# Patient Record
Sex: FEMALE | Race: WHITE | NOT HISPANIC OR LATINO | Employment: UNEMPLOYED | URBAN - METROPOLITAN AREA
[De-identification: names, ages, dates, MRNs, and addresses within clinical notes are randomized per-mention and may not be internally consistent; named-entity substitution may affect disease eponyms.]

---

## 2018-04-18 PROBLEM — A04.72 GASTROENTERITIS DUE TO CLOSTRIDIUM DIFFICILE: Status: ACTIVE | Noted: 2018-04-18

## 2018-04-18 PROBLEM — A04.72 GASTROENTERITIS DUE TO CLOSTRIDIUM DIFFICILE: Status: RESOLVED | Noted: 2018-04-18 | Resolved: 2018-04-18

## 2018-04-25 PROBLEM — F41.9 ANXIETY: Status: ACTIVE | Noted: 2018-04-25

## 2018-04-25 PROBLEM — R10.9 ABDOMINAL PAIN: Status: ACTIVE | Noted: 2018-04-25

## 2018-04-25 PROBLEM — R05.9 COUGH: Status: ACTIVE | Noted: 2018-04-25

## 2018-04-25 PROBLEM — I10 HTN (HYPERTENSION): Status: ACTIVE | Noted: 2018-04-25

## 2018-04-26 PROBLEM — K92.2 LOWER GI BLEEDING: Status: ACTIVE | Noted: 2018-04-26

## 2018-04-27 PROBLEM — A49.8 CLOSTRIDIUM DIFFICILE INFECTION: Status: ACTIVE | Noted: 2018-04-18

## 2018-04-27 PROBLEM — K92.2 LOWER GI BLEEDING: Status: RESOLVED | Noted: 2018-04-26 | Resolved: 2018-04-27

## 2018-04-27 PROBLEM — R10.9 ABDOMINAL PAIN: Status: RESOLVED | Noted: 2018-04-25 | Resolved: 2018-04-27

## 2018-05-24 PROBLEM — R11.0 NAUSEA: Status: ACTIVE | Noted: 2018-05-24

## 2018-06-04 ENCOUNTER — RESEARCH ENCOUNTER (OUTPATIENT)
Dept: RESEARCH | Facility: HOSPITAL | Age: 58
End: 2018-06-04

## 2018-06-04 ENCOUNTER — OFFICE VISIT (OUTPATIENT)
Dept: INFECTIOUS DISEASES | Facility: CLINIC | Age: 58
End: 2018-06-04
Payer: MEDICAID

## 2018-06-04 VITALS
BODY MASS INDEX: 28.64 KG/M2 | SYSTOLIC BLOOD PRESSURE: 107 MMHG | HEART RATE: 81 BPM | TEMPERATURE: 99 F | HEIGHT: 62 IN | WEIGHT: 155.63 LBS | DIASTOLIC BLOOD PRESSURE: 68 MMHG

## 2018-06-04 DIAGNOSIS — F17.200 CURRENT EVERY DAY SMOKER: ICD-10-CM

## 2018-06-04 DIAGNOSIS — R11.0 NAUSEA: ICD-10-CM

## 2018-06-04 DIAGNOSIS — A49.8 CLOSTRIDIUM DIFFICILE INFECTION: Primary | ICD-10-CM

## 2018-06-04 PROCEDURE — 99999 PR PBB SHADOW E&M-NEW PATIENT-LVL IV: CPT | Mod: PBBFAC,,, | Performed by: CLINICAL NURSE SPECIALIST

## 2018-06-04 PROCEDURE — 99204 OFFICE O/P NEW MOD 45 MIN: CPT | Mod: PBBFAC | Performed by: CLINICAL NURSE SPECIALIST

## 2018-06-04 PROCEDURE — 99203 OFFICE O/P NEW LOW 30 MIN: CPT | Mod: S$PBB,,, | Performed by: CLINICAL NURSE SPECIALIST

## 2018-06-04 RX ORDER — PROCHLORPERAZINE MALEATE 10 MG
10 TABLET ORAL 3 TIMES DAILY PRN
Qty: 21 TABLET | Refills: 0 | Status: ON HOLD | OUTPATIENT
Start: 2018-06-04 | End: 2018-07-30 | Stop reason: HOSPADM

## 2018-06-04 NOTE — LETTER
June 4, 2018      Robin Mejias MD  [address] [facility]  [place] [address]           [facility] - Infectious Diseases  [address]  [facility] [address]  Phone: [phone]  Fax: [phone]          Patient: Whitney Chapman   MR Number: [id number]   YOB: 1960   Date of Visit: 6/4/2018       Dear : Dr. Mejias    Thank you for referring Whitney Chapman to me for evaluation. Attached you will find relevant portions of my assessment and plan of care.    If you have questions, please do not hesitate to call me. I look forward to following Whitney Chapman along with you.    Sincerely,    Katie Hansen, CNS    Enclosure  CC:  No Recipients    If you would like to receive this communication electronically, please contact [email or web address] or [phone] to request more information on [employer] Link access.    For providers and/or their staff who would like to refer a patient to [facility], please contact us through our one-stop-shop provider referral line, [facility], at [phone].    If you feel you have received this communication in error or would no longer like to receive these types of communications, please e-mail [email or web address]

## 2018-06-04 NOTE — PROGRESS NOTES
Subjective:      Patient ID: Whtiney Chapman is a 58 y.o. female.    Chief Complaint:Diarrhea      History of Present Illness    Ms. Whitney Chapman is a 58 y.o. female with a past medical history of HTN, anxiety, and depression. She is a current every day smoker with a 84 pack year history. She is being seen in clinic today for evaluation of recurrent C. Diff. Her daughter is with her today.     The patient reports in February of this year her  passed away after a month stay in the ICU. The patient reports she first began having diarrhea around the time of his death that she attributed to stress. On 3/28/18 she presented to the emergency room with complaints of diarrhea. It was suspected that she had C. Diff and gastroenteritis, however a test on 4/3/18 was negative. She was prescribed 10 day course of Flagyl and Cipro. By the end of the 10 days the patient states she was feeling better and was having 2 watery diarrhea stools/day.    Ms. Chapman was admitted to [facility] from 4/25/18-4/27/18 for complaints of abdominal pain, vomiting, diarrhea, and blood in her stool. C. Diff test positive and many WBC's found, all other stool tests negative. She was sent home on 10 day course of Vancomycin. Patient reports that diarrhea improved but has never completely resolved. Patient seen on 5/24/18 by Dr. Mejias with complaints of diarrhea up to 10x day. C. Diff positive and placed on Vancomycin taper.    Today patient reports she is having 2-5 watery bowel movements a day, usually in the morning. States today she has had 5 episodes prior to coming to clinic. Denies fever. Complains of stomach cramping, that has improved over the last month, and nausea. States she has lost approximately 20 pounds since February, is very depressed and anxious over this infection.       Component      Latest Ref Rng & Units 5/24/2018 4/26/2018 4/3/2018   C. diff Antigen      Negative Positive (A) Positive (A) Negative   C difficile Toxins  A+B, EIA      Negative Positive (A) Negative Negative   C. diff PCR      Negative  Positive (A)      Called and spoke to [facility] in [place]. Patient prescribed the following for treatment of C. Diff Episodes:    1. 4/27/18-5/7/18- Vancomycin 125mg po q6 for 10 days  2. 5/24/18- Vancomycin pulsed-tapered regimen ordered      125 mg orally four times daily for 10 to 14 days, then      125 mg orally twice daily for 7 days, then      125 mg orally once daily for 7 days, then                 125 mg orally every 2 or 3 days for 2 to 8 weeks      Review of Systems   Constitution: Positive for chills, decreased appetite, weakness, malaise/fatigue, night sweats and weight loss. Negative for fever and weight gain.   HENT: Positive for congestion. Negative for ear pain, hearing loss, hoarse voice, sore throat and tinnitus.    Eyes: Negative for blurred vision, redness and visual disturbance.   Cardiovascular: Negative for chest pain, leg swelling and palpitations.   Respiratory: Positive for cough and shortness of breath. Negative for hemoptysis and sputum production.    Hematologic/Lymphatic: Negative for adenopathy. Does not bruise/bleed easily.   Skin: Negative for dry skin, itching, rash and suspicious lesions.   Musculoskeletal: Positive for back pain, myalgias and neck pain. Negative for joint pain.   Gastrointestinal: Positive for abdominal pain, diarrhea, heartburn and nausea. Negative for constipation and vomiting.   Genitourinary: Negative for dysuria, flank pain, frequency, hematuria, hesitancy and urgency.   Neurological: Positive for dizziness and headaches. Negative for numbness and paresthesias.   Psychiatric/Behavioral: Positive for depression and memory loss. The patient has insomnia and is nervous/anxious.      Objective:   Physical Exam   Constitutional: She is oriented to person, place, and time. She appears well-developed and well-nourished.   HENT:   Head:  "Normocephalic and atraumatic.   Right Ear: External ear normal.   Left Ear: External ear normal.   Eyes: Conjunctivae and EOM are normal.   Neck: Normal range of motion.   Cardiovascular: Normal rate.    Pulmonary/Chest: Effort normal. No respiratory distress.   Abdominal: Soft. There is tenderness.   Musculoskeletal: Normal range of motion. She exhibits no edema.   Neurological: She is alert and oriented to person, place, and time.   Skin: Skin is warm and dry.   Psychiatric: Her behavior is normal. Judgment and thought content normal.   Vitals reviewed.    Vitals:    06/04/18 1057   BP: 107/68   BP Location: Left arm   Pulse: 81   Temp: 98.6 °F (37 °C)   TempSrc: Oral   Weight: 70.6 kg (155 lb 10.3 oz)   Height: 5' 2" (1.575 m)       Assessment:       1. Clostridium difficile infection    2. Nausea    3. Current every day smoker          Plan:        1. Dificid 200mg po BID for 10 days. [facility] If trouble getting filled please notify ID clinic.   2. Once get Dificid prescription stop taking Vancomycin and start taking Dificid.   3. Continue Compazine prescription for nausea, refill sent to pharmacy.   4. Discussed treatment options with patient. Patient would like to enroll in Study Title/IRB number A Phase 3 Prospective, Randomized, Double-blinded, Placebo-controlled Clinical Study to Evaluate the Efficacy and Safety of Rebiotix [id number] (microbiota suspension) for the Prevention of Recurrent Clostridium difficile Infection/ 2017.232 (PI: Alcira). Consent signed and screening visit conducted. No research/study procedures conducted before consent signed.  5. Patient and daughter educated on good handwashing. Education patient not to take any anti-diarrheal medications ex: Imodium. Encouraged to reduce/stop smoking.  6. Will follow up with patient to schedule research FMT. Patient instructed to call if any questions, change or worsening of condition.     "

## 2018-06-04 NOTE — PATIENT INSTRUCTIONS
· Start taking Dificid (Fidaxomicin) 200mg by mouth twice a day for 10 days  · Once prescription for Dificid (Fidaxomicin) filled stop taking Vancomycin   · If problems getting new medication filled please let us know  · Any questions, problems or concerns call Katie with ID research at [phone]

## 2018-06-06 ENCOUNTER — TELEPHONE (OUTPATIENT)
Dept: PHARMACY | Facility: CLINIC | Age: 58
End: 2018-06-06

## 2018-06-06 DIAGNOSIS — A49.8 CLOSTRIDIUM DIFFICILE INFECTION: Primary | ICD-10-CM

## 2018-06-07 DIAGNOSIS — A49.8 CLOSTRIDIUM DIFFICILE INFECTION: ICD-10-CM

## 2018-06-12 ENCOUNTER — TELEPHONE (OUTPATIENT)
Dept: INFECTIOUS DISEASES | Facility: CLINIC | Age: 58
End: 2018-06-12

## 2018-06-12 NOTE — TELEPHONE ENCOUNTER
Called and spoke to Ms. Whitney Chapman yesterday, 6/11/18. Prescription for Dificid was approved and she began taking on 6/7/18. She stopped taking Vancomycin that same day as instructed.     Will complete 10 day course of Dificid 200mg BID on Saturday 6/16/18. Patient reports she is currently having soft formed stools about 2 times a day, and is feeling much better. No longer having watery diarrhea.     Patient verbalized she would still like to enroll in the the Rebiotix trial. Plan is for Ms. Chapman to come to clinic 6/18/18 for administration of Rebiotix [id number] (microbiota suspension). Patient notified to contact the ID research team for any questions or concerns.

## 2018-06-18 ENCOUNTER — RESEARCH ENCOUNTER (OUTPATIENT)
Dept: RESEARCH | Facility: HOSPITAL | Age: 58
End: 2018-06-18

## 2018-06-18 ENCOUNTER — OFFICE VISIT (OUTPATIENT)
Dept: INFECTIOUS DISEASES | Facility: CLINIC | Age: 58
End: 2018-06-18
Payer: MEDICAID

## 2018-06-18 VITALS
SYSTOLIC BLOOD PRESSURE: 138 MMHG | DIASTOLIC BLOOD PRESSURE: 79 MMHG | HEIGHT: 62 IN | TEMPERATURE: 99 F | HEART RATE: 115 BPM | BODY MASS INDEX: 28.72 KG/M2 | WEIGHT: 156.06 LBS

## 2018-06-18 DIAGNOSIS — A49.8 CLOSTRIDIUM DIFFICILE INFECTION: Primary | ICD-10-CM

## 2018-06-18 LAB
C DIFF GDH STL QL: NEGATIVE
C DIFF TOX A+B STL QL IA: NEGATIVE

## 2018-06-18 PROCEDURE — 99999 PR PBB SHADOW E&M-EST. PATIENT-LVL IV: CPT | Mod: PBBFAC,,, | Performed by: CLINICAL NURSE SPECIALIST

## 2018-06-18 PROCEDURE — 99213 OFFICE O/P EST LOW 20 MIN: CPT | Mod: S$PBB,,, | Performed by: CLINICAL NURSE SPECIALIST

## 2018-06-18 PROCEDURE — 99214 OFFICE O/P EST MOD 30 MIN: CPT | Mod: PBBFAC | Performed by: CLINICAL NURSE SPECIALIST

## 2018-06-18 PROCEDURE — 87449 NOS EACH ORGANISM AG IA: CPT

## 2018-06-18 NOTE — PROGRESS NOTES
Subjective:      Patient ID: Whitney Chapman is a 58 y.o. female.    Chief Complaint:Follow-up      History of Present Illness    Ms. Whitney Chapman presented today for enrollment and enema administration for Study Title/IRB number A Phase 3 Prospective, Randomized, Double-blinded, Placebo-controlled Clinical Study to Evaluate the Efficacy and Safety of Rebiotix [id number] (microbiota suspension) for the Prevention of Recurrent Clostridium difficile Infection/ 2017.232 (PI: Alcira).     Patient looks and states she is feeling much better than when in last saw in clinic. Upon assessment diarrhea not controlled per study inclusion criteria. Patient reports over weekend had 5 bowel movements a day there are soft. States increased abdominal discomfort and bloating. Unable to enroll in study at this time.       Review of Systems   Constitution: Positive for chills, decreased appetite, weakness, malaise/fatigue and night sweats. Negative for fever, weight gain and weight loss.   HENT: Negative for congestion, ear pain, hearing loss, hoarse voice, sore throat and tinnitus.    Eyes: Negative for blurred vision, redness and visual disturbance.   Cardiovascular: Negative for chest pain, leg swelling and palpitations.   Respiratory: Positive for shortness of breath. Negative for cough, hemoptysis and sputum production.    Hematologic/Lymphatic: Negative for adenopathy. Does not bruise/bleed easily.   Skin: Positive for itching. Negative for dry skin, rash and suspicious lesions.   Musculoskeletal: Positive for myalgias and neck pain. Negative for back pain and joint pain.   Gastrointestinal: Positive for diarrhea and nausea. Negative for abdominal pain, constipation, heartburn and vomiting.   Genitourinary: Positive for frequency. Negative for dysuria, flank pain, hematuria, hesitancy and urgency.   Neurological: Positive for dizziness. Negative for headaches, numbness and paresthesias.   Psychiatric/Behavioral:  Positive for depression and memory loss. The patient has insomnia and is nervous/anxious.      Objective:   Physical Exam   Constitutional: She is oriented to person, place, and time. She appears well-developed and well-nourished.   HENT:   Head: Normocephalic and atraumatic.   Eyes: Conjunctivae and EOM are normal.   Neck: Normal range of motion.   Pulmonary/Chest: Effort normal. No respiratory distress.   Abdominal: Soft. She exhibits distension. There is tenderness.   Mild distention and tenderness noted   Musculoskeletal: Normal range of motion.   Neurological: She is alert and oriented to person, place, and time.   Skin: Skin is warm and dry.   Psychiatric: She has a normal mood and affect. Her behavior is normal. Judgment and thought content normal.   Vitals reviewed.    Assessment:       1. Clostridium difficile infection          Plan:       1. Dificid 200mg po BID x 10days  2. Stool test for C. Diff  3. Will follow up with patient by phone in week to assess if diarrhea controlled. At that time will discuss eligibility for Rebiotix study vs. alternative treatment options.

## 2018-06-18 NOTE — PROGRESS NOTES
Study Name: A Phase 3 Prospective, Randomized, Double-blinded, Placebo Controlled Clinical Study Demonstrating the Efficacy and Safety of Rebiotix [id number] (microbiota suspension) for the Prevention of Recurrent Clostridium difficile Infection     IRB# 2017.232     Sponsor: Ayse     PI: Dr. Darling Eli     Visit: Baseline/Enema Drug Administration Visit    Patient seen in ID clinic for baseline/ treatment visit per study protocol.  ICF signed at previous visit.        Per visit protocol, I met with the patient and discussed the completed Subject Diary and current medications.  Pt confirmed she stopped taking antibiotics on 6/17/18 per study protocol.  Pt still having 3 plus diarrheal bowel movements for last 2 days.  Per study protocol, patient does not have controlled diarrhea and no longer fits study criteria.        Pt's study stool specimen was sent to [facility] lab for retest for C. diff.  Patient will be removed from the study and follow-up with ID for additional treatment.

## 2018-06-22 ENCOUNTER — TELEPHONE (OUTPATIENT)
Dept: INFECTIOUS DISEASES | Facility: CLINIC | Age: 58
End: 2018-06-22

## 2018-06-22 DIAGNOSIS — A49.8 CLOSTRIDIUM DIFFICILE INFECTION: Primary | ICD-10-CM

## 2018-06-22 NOTE — TELEPHONE ENCOUNTER
Called and spoke to Ms. Whitney Chapman. She is no longer a candidate for the research trial (IRB# 2017.232) as she no longer meets inclusion criteria. We are able to offer her treatment through the assured active treatment program where she will received the Rebiotix [id number] (microbiota suspension) enema. This product is an investigational FMT product. The patient does not need to enrolled in a research study to receive this product.    The patient verbalized she would like to receive the treatment.     Plan:  1. Complete Dificid 200mg po BID x10 days. Last dose 6/27/18  2. Lab work Friday 6/29/18- HIV, Hep B, Hep C, RPR  3. Return to clinic 6/29/18 at 11am for Rebiotix [id number] enema administration    Patient informed of plan. Will call clinic for any new problems or concerns.

## 2018-06-29 ENCOUNTER — OFFICE VISIT (OUTPATIENT)
Dept: INFECTIOUS DISEASES | Facility: CLINIC | Age: 58
End: 2018-06-29
Payer: MEDICAID

## 2018-06-29 ENCOUNTER — LAB VISIT (OUTPATIENT)
Dept: LAB | Facility: HOSPITAL | Age: 58
End: 2018-06-29
Payer: MEDICAID

## 2018-06-29 VITALS
SYSTOLIC BLOOD PRESSURE: 135 MMHG | BODY MASS INDEX: 27.95 KG/M2 | RESPIRATION RATE: 16 BRPM | WEIGHT: 151.88 LBS | HEART RATE: 105 BPM | DIASTOLIC BLOOD PRESSURE: 85 MMHG | HEIGHT: 62 IN | TEMPERATURE: 98 F

## 2018-06-29 DIAGNOSIS — A49.8 CLOSTRIDIUM DIFFICILE INFECTION: ICD-10-CM

## 2018-06-29 DIAGNOSIS — A04.72 C. DIFFICILE DIARRHEA: Primary | ICD-10-CM

## 2018-06-29 LAB
HBV CORE AB SERPL QL IA: NEGATIVE
HBV SURFACE AB SER-ACNC: NEGATIVE M[IU]/ML
HBV SURFACE AG SERPL QL IA: NEGATIVE
HCV AB SERPL QL IA: NEGATIVE
HIV 1+2 AB+HIV1 P24 AG SERPL QL IA: NEGATIVE

## 2018-06-29 PROCEDURE — 86803 HEPATITIS C AB TEST: CPT

## 2018-06-29 PROCEDURE — 99214 OFFICE O/P EST MOD 30 MIN: CPT | Mod: S$PBB,,, | Performed by: CLINICAL NURSE SPECIALIST

## 2018-06-29 PROCEDURE — 86592 SYPHILIS TEST NON-TREP QUAL: CPT

## 2018-06-29 PROCEDURE — 99999 PR PBB SHADOW E&M-EST. PATIENT-LVL V: CPT | Mod: PBBFAC,,, | Performed by: CLINICAL NURSE SPECIALIST

## 2018-06-29 PROCEDURE — 86703 HIV-1/HIV-2 1 RESULT ANTBDY: CPT

## 2018-06-29 PROCEDURE — 99215 OFFICE O/P EST HI 40 MIN: CPT | Mod: PBBFAC | Performed by: CLINICAL NURSE SPECIALIST

## 2018-06-29 PROCEDURE — 86704 HEP B CORE ANTIBODY TOTAL: CPT

## 2018-06-29 PROCEDURE — 86706 HEP B SURFACE ANTIBODY: CPT

## 2018-06-29 PROCEDURE — 36415 COLL VENOUS BLD VENIPUNCTURE: CPT

## 2018-06-29 PROCEDURE — 87340 HEPATITIS B SURFACE AG IA: CPT

## 2018-06-29 NOTE — PROGRESS NOTES
Subjective:      Patient ID: Whitney Chapman is a 58 y.o. female.    Chief Complaint:Follow-up      History of Present Illness    Ms. Whitney Chapman is a 58 y.o. female who returns to clinic today for administration of Rebiotix [id number] (microbiota suspension) enema.        Ms. Chapman has a past medical history of HTN, anxiety, and depression. She is a current every day smoker with a 84 pack year history. She was originally seen in clinic on 6/4/18 after being diagnosed with C. Diff for a second time. She was prescribed Dificid 200mg BID for 10 days and enrolled in the Rebiotix RBX 2660 study. She returned on 6/18/18 for randomization and enema administration, but was assessed that her diarrhea was not under control per the study inclusion criteria. Ms. Chapman was prescribed 10 more days of Dificid 100mg BID. While she not longer meets inclusion criteria for the Rebiotix study she was offered and agreed to receive the Rebiotix [id number] (microbiota suspension) through the assured active treatment program.      The patient reports that she is not having any episodes of watery diarrhea. She completed a 10 days course of Dificid on 6/27/18.  She is now having 2-3 soft/mushy bowel movements a day. Denies fever, chills. No new complaints. States she is now seeing a psychiatrist to help her with her anxiety and depression. She is very nervous today, but hopeful this treatment will help.       RBX Lot#     Review of Systems   Constitution: Positive for decreased appetite and night sweats. Negative for chills, fever, weakness, malaise/fatigue, weight gain and weight loss.   HENT: Positive for congestion. Negative for ear pain, hearing loss, hoarse voice, sore throat and tinnitus.    Eyes: Negative for blurred vision, redness and visual disturbance.   Cardiovascular: Negative for chest pain, leg swelling and palpitations.   Respiratory: Positive for cough. Negative for hemoptysis, shortness of breath and sputum  production.    Hematologic/Lymphatic: Negative for adenopathy. Does not bruise/bleed easily.   Skin: Negative for dry skin, itching, rash and suspicious lesions.   Musculoskeletal: Positive for myalgias. Negative for back pain, joint pain and neck pain.   Gastrointestinal: Positive for nausea. Negative for abdominal pain, constipation, diarrhea, heartburn and vomiting.   Genitourinary: Negative for dysuria, flank pain, frequency, hematuria, hesitancy and urgency.   Neurological: Negative for dizziness, headaches, numbness and paresthesias.   Psychiatric/Behavioral: Positive for depression. Negative for memory loss. The patient has insomnia and is nervous/anxious.      Objective:   Physical Exam   Constitutional: She is oriented to person, place, and time. She appears well-developed and well-nourished.   HENT:   Head: Normocephalic and atraumatic.   Right Ear: External ear normal.   Left Ear: External ear normal.   Nose: Nose normal.   Eyes: Conjunctivae and EOM are normal. Right eye exhibits no discharge. Left eye exhibits no discharge.   Neck: Trachea normal and normal range of motion. Neck supple. No JVD present.   Cardiovascular: Normal rate and normal heart sounds.    No murmur heard.  Pulmonary/Chest: Effort normal and breath sounds normal. No respiratory distress.   Abdominal: Soft. Bowel sounds are normal. She exhibits no distension. There is no tenderness. There is no guarding.   Musculoskeletal: Normal range of motion. She exhibits no edema.   Neurological: She is alert and oriented to person, place, and time.   Skin: Skin is warm and dry. Capillary refill takes less than 2 seconds.   Psychiatric: Her behavior is normal. Judgment and thought content normal. Her mood appears anxious. Cognition and memory are normal.   Vitals reviewed.    Vitals:    06/29/18 1045 06/29/18 1134   BP: 118/77 135/85   Pulse: 100 105   Resp: 16 16   Temp: 97.8 °F (36.6 °C) 97.6 °F (36.4 °C)   Weight: 68.9 kg (151 lb 14.4 oz)   "  Height: 5' 2" (1.575 m)        Assessment:       1. C. difficile diarrhea        Consent for treatment reviewed with patient who given time to ask questions. Consent signed by patient and witnessed (see media tab). Rebiotix [id number] microbiota enema administrated per product protocol. Enema bag completely infused over 2 minutes. Patient tolerated well with no leakage from rectum noted. Patient laid in lateral decubitus position for 15 minutes post administration.   Plan:       · Return to clinic in 2 weeks 7/13/18 at 11am  · Educated patient and her daughter that she may experience gas, bloating, diarrhea, cramping, fever <100.4, nausea, fatigue over next 24-48 hours. Call is symptoms severe.  · Notify ID if diarrhea returns.       "

## 2018-06-29 NOTE — PATIENT INSTRUCTIONS
· Return to clinic in 2 weeks 7/13/18 at 11am  · May experience gas, bloating, diarrhea, cramping, fever <100.4, nausea, fatigue over next 24-48 hours. Call is symptoms severe  · Notify ID is diarrhea returns [phone]

## 2018-06-30 LAB — RPR SER QL: NORMAL

## 2018-07-13 ENCOUNTER — OFFICE VISIT (OUTPATIENT)
Dept: INFECTIOUS DISEASES | Facility: CLINIC | Age: 58
End: 2018-07-13
Payer: MEDICAID

## 2018-07-13 VITALS
TEMPERATURE: 98 F | DIASTOLIC BLOOD PRESSURE: 64 MMHG | SYSTOLIC BLOOD PRESSURE: 117 MMHG | BODY MASS INDEX: 29.7 KG/M2 | HEIGHT: 62 IN | WEIGHT: 161.38 LBS | HEART RATE: 52 BPM

## 2018-07-13 DIAGNOSIS — A04.72 C. DIFFICILE DIARRHEA: Primary | ICD-10-CM

## 2018-07-13 PROCEDURE — 99999 PR PBB SHADOW E&M-EST. PATIENT-LVL III: CPT | Mod: PBBFAC,,, | Performed by: CLINICAL NURSE SPECIALIST

## 2018-07-13 PROCEDURE — 99213 OFFICE O/P EST LOW 20 MIN: CPT | Mod: S$PBB,,, | Performed by: CLINICAL NURSE SPECIALIST

## 2018-07-13 PROCEDURE — 99213 OFFICE O/P EST LOW 20 MIN: CPT | Mod: PBBFAC | Performed by: CLINICAL NURSE SPECIALIST

## 2018-07-13 NOTE — PROGRESS NOTES
Subjective:      Patient ID: Whitney Chapman is a 58 y.o. female.    Chief Complaint:Follow-up      History of Present Illness    Ms. Whitney Chapman is a 58 y.o. female who returns to clinic today for follow up. He has a past medical history of two episodes of C. Diff. On 6/29/18 she received the Rebiotix [id number] (microbiota suspension) through the assured active treatment program.      Mr. Chapman reports she is feeling much better. States she is now having one soft/formed bowel movement a day on average and has gone 2 days without a bowel movement for the first time in 6 months. She reports prior to having C.Diff she did not always have a bowel movement every day. Her appetite is improving and she is able to eat food she hasn't tolerated in months. She is still having anxiety and will follow up with psychiatry, but does feel her stress level has decreased now that she feels the C. Diff is under control. Denies any new problems/concerns.       Review of Systems   Constitution: Negative for chills, decreased appetite, fever, weakness, malaise/fatigue, night sweats, weight gain and weight loss.   HENT: Positive for congestion. Negative for ear pain, hearing loss, hoarse voice, sore throat and tinnitus.    Eyes: Negative for blurred vision, redness and visual disturbance.   Cardiovascular: Negative for chest pain, leg swelling and palpitations.   Respiratory: Negative for cough, hemoptysis, shortness of breath and sputum production.    Hematologic/Lymphatic: Negative for adenopathy. Does not bruise/bleed easily.   Skin: Negative for dry skin, itching, rash and suspicious lesions.   Musculoskeletal: Positive for myalgias. Negative for back pain, joint pain and neck pain.   Gastrointestinal: Negative for abdominal pain, constipation, diarrhea, heartburn, nausea and vomiting.   Genitourinary: Negative for dysuria, flank pain, frequency, hematuria, hesitancy and urgency.   Neurological: Negative for dizziness,  headaches, numbness and paresthesias.   Psychiatric/Behavioral: Negative for depression and memory loss. The patient has insomnia and is nervous/anxious.      Objective:   Physical Exam   Constitutional: She is oriented to person, place, and time. She appears well-developed and well-nourished.   HENT:   Head: Normocephalic and atraumatic.   Right Ear: External ear normal.   Left Ear: External ear normal.   Nose: Nose normal.   Eyes: Conjunctivae and EOM are normal. Right eye exhibits no discharge. Left eye exhibits no discharge.   Neck: Trachea normal and normal range of motion. Neck supple. No JVD present.   Cardiovascular: Normal rate.    Pulmonary/Chest: Effort normal. No respiratory distress.   Abdominal: Soft. Bowel sounds are normal. She exhibits no distension. There is no tenderness. There is no guarding.   Musculoskeletal: Normal range of motion. She exhibits no edema.   Neurological: She is alert and oriented to person, place, and time.   Skin: Skin is warm and dry. Capillary refill takes less than 2 seconds. Rash noted.   Healing ant bites to bilateral hands   Psychiatric: She has a normal mood and affect. Her behavior is normal. Judgment and thought content normal. Cognition and memory are normal.   Vitals reviewed.    Assessment:       1. C. difficile diarrhea        Discussed with patient that she is doing well and if no return of symptoms by week 8 will consider this a treatment success. If diarrhea returns she should call the ID clinic immediately and will order to test stool for C. Diff. Otherwise will call her in 6 weeks to follow up on how she is doing.  Plan:       1. If diarrhea returns notify ID clinic  2. Follow up phone call in 6 weeks (8 weeks post-enema administration)  3. Return to clinic as needed for problems/concerns

## 2018-07-22 PROBLEM — R44.0 AUDITORY HALLUCINATIONS: Status: ACTIVE | Noted: 2018-07-22

## 2018-07-30 PROBLEM — F33.2 SEVERE RECURRENT MAJOR DEPRESSION WITHOUT PSYCHOTIC FEATURES: Status: ACTIVE | Noted: 2018-07-30

## 2018-07-30 PROBLEM — R44.0 AUDITORY HALLUCINATIONS: Status: RESOLVED | Noted: 2018-07-22 | Resolved: 2018-07-30

## 2018-08-01 ENCOUNTER — PATIENT OUTREACH (OUTPATIENT)
Dept: ADMINISTRATIVE | Facility: CLINIC | Age: 58
End: 2018-08-01

## 2018-08-01 NOTE — PROGRESS NOTES
[phone] [facility]  [phone]   C3 nurse attempted to contact patient. No answer. The following message was left for the patient to return the call:  Good morning  I am a nurse calling on behalf of [facility] from the [facility].  This is a Transitional Care Call for Whitney Chapman. When you have a moment please contact us at [phone] or [phone] Monday through Friday, between the hours of 8 am to 4 pm. We look forward to speaking with you. On behalf of [facility] have a nice day.    The patient has a scheduled [facility] appointment with [facility] on 08/01/18 @ 1400 hrs.

## 2018-08-01 NOTE — PATIENT INSTRUCTIONS
"Know the Signs and Symptoms of Depression  Everyone feels down at times. The blues are a natural part of life. But an unhappy period thats intense or lasts for more than a couple of weeks can be a sign of depression.  Depression is a serious illness. It is not a sign of weakness or a "character flaw," and it is not something you can "snap out of." In fact, most people with depression need treatment to get better. Depression can disrupt the lives of family and friends. If you know someone you think may be depressed, find out what you can do to help.    Recognizing signs of depression  People who are depressed may:  · Feel unhappy, sad, blue, down, or miserable nearly every day  · Feel helpless, hopeless, or worthless  · Lose interest in hobbies, friends, and activities that used to give pleasure  · Not sleep well or sleep too much  · Gain or lose weight  · Feel low on energy or constantly tired  · Have a hard time concentrating or making decisions  · Lose interest in sex  · Have physical symptoms, such as stomachaches, headaches, or backaches  Know the serious signals  Never ignore a person's comments about suicide or behaviors that can lead to self-harm. Warning signals for suicide include:  · Threats or talk of suicide  · Statements such as I wont be a problem much longer or Nothing matters  · Giving away possessions or making a will or  arrangements  · Buying a gun or other weapon  · Sudden, unexplained cheerfulness or calm after a period of depression  If you notice any of these signs, get help right away. Call a healthcare professional, mental health clinic, or suicide hotline and ask what action to take. In an emergency, dont hesitate to call the police.  Resources:  · National Institutes of Mental Utgnpc764-258-3533yka.[facility] Childrenh.nih.gov  · National [place] on Mental Yiivfuf472-726-8865ixd.douglas.org   · Mental Health Orpnjni265-094-5827oii.nmha.org  · National Suicide Isjnfhe634-433-5820 " (800-SUICIDE)  · National Suicide Prevention Ezhttxnz873-145-6872 (429-101-EONV)www.suicidepreventionlifeline.org   Date Last Reviewed: 1/1/2017  © 4403-2143 [employer]. [address] [place] [id number]. All rights reserved. This information is not intended as a substitute for professional medical care. Always follow your healthcare professional's instructions.

## 2020-02-13 ENCOUNTER — PATIENT OUTREACH (OUTPATIENT)
Dept: ADMINISTRATIVE | Facility: HOSPITAL | Age: 60
End: 2020-02-13